# Patient Record
Sex: FEMALE | URBAN - METROPOLITAN AREA
[De-identification: names, ages, dates, MRNs, and addresses within clinical notes are randomized per-mention and may not be internally consistent; named-entity substitution may affect disease eponyms.]

---

## 2017-03-06 ENCOUNTER — APPOINTMENT (RX ONLY)
Dept: URBAN - METROPOLITAN AREA CLINIC 152 | Facility: CLINIC | Age: 52
Setting detail: DERMATOLOGY
End: 2017-03-06

## 2017-03-06 DIAGNOSIS — L70.0 ACNE VULGARIS: ICD-10-CM

## 2017-03-06 DIAGNOSIS — L73.8 OTHER SPECIFIED FOLLICULAR DISORDERS: ICD-10-CM

## 2017-03-06 PROCEDURE — ? COUNSELING

## 2017-03-06 PROCEDURE — 99202 OFFICE O/P NEW SF 15 MIN: CPT

## 2017-03-06 ASSESSMENT — LOCATION DETAILED DESCRIPTION DERM
LOCATION DETAILED: LEFT CENTRAL MALAR CHEEK
LOCATION DETAILED: RIGHT CENTRAL MALAR CHEEK
LOCATION DETAILED: LEFT SUPERIOR MEDIAL FOREHEAD

## 2017-03-06 ASSESSMENT — LOCATION SIMPLE DESCRIPTION DERM
LOCATION SIMPLE: LEFT FOREHEAD
LOCATION SIMPLE: RIGHT CHEEK
LOCATION SIMPLE: LEFT CHEEK

## 2017-03-06 ASSESSMENT — LOCATION ZONE DERM: LOCATION ZONE: FACE

## 2017-03-06 ASSESSMENT — SEVERITY ASSESSMENT OVERALL AMONG ALL PATIENTS
IN YOUR EXPERIENCE, AMONG ALL PATIENTS YOU HAVE SEEN WITH THIS CONDITION, HOW SEVERE IS THIS PATIENT'S CONDITION?: FEW INFLAMMATORY LESIONS, SOME NONINFLAMMATORY

## 2017-03-06 NOTE — HPI: PIMPLES (ACNE)
How Severe Is Your Acne?: moderate
Is This A New Presentation, Or A Follow-Up?: Acne
Additional Comments (Use Complete Sentences): Pt gets cystic acne on her cheeks .

## 2021-05-19 ENCOUNTER — OFFICE VISIT (OUTPATIENT)
Dept: URBAN - METROPOLITAN AREA CLINIC 88 | Facility: CLINIC | Age: 56
End: 2021-05-19
Payer: COMMERCIAL

## 2021-05-19 DIAGNOSIS — H04.123 DRY EYE SYNDROME OF BILATERAL LACRIMAL GLANDS: Chronic | ICD-10-CM

## 2021-05-19 DIAGNOSIS — H40.1124 PRIMARY OPEN-ANGLE GLAUCOMA, LEFT EYE, INDETERMINATE STAGE: Chronic | ICD-10-CM

## 2021-05-19 DIAGNOSIS — H10.413 CONJUNCTIVITIS - ALLERGIC: ICD-10-CM

## 2021-05-19 DIAGNOSIS — H52.4 PRESBYOPIA: Primary | Chronic | ICD-10-CM

## 2021-05-19 DIAGNOSIS — H25.13 AGE-RELATED NUCLEAR CATARACT, BILATERAL: Chronic | ICD-10-CM

## 2021-05-19 DIAGNOSIS — H40.1113 PRIMARY OPEN-ANGLE GLAUCOMA, RIGHT EYE, SEVERE STAGE: Chronic | ICD-10-CM

## 2021-05-19 PROCEDURE — 92004 COMPRE OPH EXAM NEW PT 1/>: CPT | Performed by: OPTOMETRIST

## 2021-05-19 RX ORDER — BIMATOPROST 0.3 MG/ML
0.03 % SOLUTION/ DROPS OPHTHALMIC
Qty: 5 | Refills: 2 | Status: INACTIVE
Start: 2021-05-19 | End: 2021-08-30

## 2021-05-19 ASSESSMENT — INTRAOCULAR PRESSURE
OD: 26
OD: 27
OS: 27
OS: 26

## 2021-05-19 ASSESSMENT — VISUAL ACUITY
OS: 20/20
OD: 20/20

## 2021-05-19 NOTE — IMPRESSION/PLAN
Impression: Conjunctivitis - Allergic: H10.413. Plan: Patient educated that ocular itchiness/irritation is caused by allergies and that treatment will help alleviate symptoms, but will not cure allergies. Recommended OTC Pataday QD OU or Alaway (ketotifen) BID OU for ocular itch.   Patient knows to return to clinic if irritation continues

## 2021-05-19 NOTE — IMPRESSION/PLAN
Impression: Primary open-angle glaucoma, right eye, severe stage: H40.1113. Plan: Patient is a patient of Dr. Elizabeth Villafuerte for her glaucoma care, and has an appt scheduled in July 2021. She has not been seen for 1 year due to the pandemic and 4 deaths in her family. IOP elevated today OU. Pt using Timolol BID OU. Start Bimatoprost 0.03% QHS OU. Re-check IOP in 1 month.

## 2021-05-19 NOTE — IMPRESSION/PLAN
Impression: Dry eye syndrome of bilateral lacrimal glands: H04.123. Plan: Discussed chronic nature of condition in detail with patient. Explained condition does not have a cure and will need artificial tears for maintenance. Recommended artificial tears (Refresh or Systane brand) QID OU for continuous maintenance of tear film. Continue Restasis BID OU, as directed.

## 2021-05-19 NOTE — IMPRESSION/PLAN
Impression: Primary open-angle glaucoma, left eye, indeterminate stage: X96.4249. Plan: Indeterminate stage at this time as no VF is available and ONH appears more full/normal than OD. Pt to return to Dr. Ruby Frost for glaucoma care in 2 months. Will monitor IOP until then.

## 2021-06-16 ENCOUNTER — OFFICE VISIT (OUTPATIENT)
Dept: URBAN - METROPOLITAN AREA CLINIC 88 | Facility: CLINIC | Age: 56
End: 2021-06-16
Payer: COMMERCIAL

## 2021-06-16 PROCEDURE — 99213 OFFICE O/P EST LOW 20 MIN: CPT | Performed by: OPTOMETRIST

## 2021-06-16 ASSESSMENT — INTRAOCULAR PRESSURE
OD: 18
OS: 18

## 2021-06-16 NOTE — IMPRESSION/PLAN
Impression: Primary open-angle glaucoma, right eye, severe stage: H40.1113. Plan: Patient was referred by an ophthalmologist to Dr. Joyce Link for her glaucoma care over 1 year ago. She doesn't have an appt for her testing and follow-up with him until next month, so monitoring IOP until then. Continue Timolol BID OU and Bimatoprost 0.03% QHS OU. Will request records for patient's testings from Dr. Joyce Link as patient would prefer to do IOP checks here if possible. RTC for IOP and VF 24-2 in 4 months.

## 2022-06-22 ENCOUNTER — OFFICE VISIT (OUTPATIENT)
Dept: URBAN - METROPOLITAN AREA CLINIC 88 | Facility: CLINIC | Age: 57
End: 2022-06-22
Payer: COMMERCIAL

## 2022-06-22 DIAGNOSIS — H25.13 AGE-RELATED NUCLEAR CATARACT, BILATERAL: ICD-10-CM

## 2022-06-22 DIAGNOSIS — H52.4 PRESBYOPIA: ICD-10-CM

## 2022-06-22 DIAGNOSIS — H40.1124 PRIMARY OPEN-ANGLE GLAUCOMA, LEFT EYE, INDETERMINATE STAGE: ICD-10-CM

## 2022-06-22 DIAGNOSIS — H40.1113 PRIMARY OPEN-ANGLE GLAUCOMA, RIGHT EYE, SEVERE STAGE: Primary | ICD-10-CM

## 2022-06-22 DIAGNOSIS — H04.123 DRY EYE SYNDROME OF BILATERAL LACRIMAL GLANDS: ICD-10-CM

## 2022-06-22 PROCEDURE — 99213 OFFICE O/P EST LOW 20 MIN: CPT | Performed by: OPTOMETRIST

## 2022-06-22 ASSESSMENT — VISUAL ACUITY
OS: 20/25
OD: 20/30

## 2022-06-22 ASSESSMENT — INTRAOCULAR PRESSURE
OD: 16
OS: 16

## 2022-06-22 NOTE — IMPRESSION/PLAN
Impression: Dry eye syndrome of bilateral lacrimal glands: H04.123. Plan: Discussed chronic nature of condition in detail with patient and that it is causing intermittent blurred vision. Explained condition does not have a cure and will need artificial tears for maintenance. Recommended artificial tears (Refresh or Systane brand) QID OU for continuous maintenance of tear film, also recommend Gen Teal gel QHS OU and Omega-3 supplement 1500mg PO QD. Continue Restasis BID OU, as directed.

## 2022-06-22 NOTE — IMPRESSION/PLAN
Impression: Presbyopia: H52.4. Plan: Patient will try artificial tears and gels to treat dryness before proceeding with refraction for glasses so that a better rx can be measured. RTC in 4-6 weeks for dry eye follow up and refraction for glasses.

## 2022-06-22 NOTE — IMPRESSION/PLAN
Impression: Primary open-angle glaucoma, right eye, severe stage: H40.1113. Plan: Patient primarily sees Dr. Nasreen Falcon for her glaucoma care. She had a follow-up with him just 1-2 weeks ago and had Alphagan P added OS at that visit. She has repeat VF testing scheduled in the,next 2-4 weeks with Nasreen Falcon as well. Since we were unable to obtain faxed notes after last visit, pt was asked to request copies when she is there next and bring them to her next appt so e can update our files and have a better picture of the severity of her glaucoma. Pt understands that little or poor control of glaucoma comes with increased risk of permanent blindness. Continue Timolol BID OU and Bimatoprost 0.03% QHS OU and Alphagan P BID OS only.